# Patient Record
Sex: MALE | Race: WHITE | NOT HISPANIC OR LATINO | Employment: STUDENT | URBAN - METROPOLITAN AREA
[De-identification: names, ages, dates, MRNs, and addresses within clinical notes are randomized per-mention and may not be internally consistent; named-entity substitution may affect disease eponyms.]

---

## 2018-11-08 ENCOUNTER — OFFICE VISIT (OUTPATIENT)
Dept: URGENT CARE | Facility: CLINIC | Age: 23
End: 2018-11-08

## 2018-11-08 VITALS
HEART RATE: 61 BPM | BODY MASS INDEX: 22.53 KG/M2 | OXYGEN SATURATION: 99 % | DIASTOLIC BLOOD PRESSURE: 86 MMHG | WEIGHT: 170 LBS | HEIGHT: 73 IN | TEMPERATURE: 97 F | SYSTOLIC BLOOD PRESSURE: 133 MMHG

## 2018-11-08 DIAGNOSIS — Z02.0 SCHOOL PHYSICAL EXAM: ICD-10-CM

## 2018-11-08 DIAGNOSIS — Z11.1 ENCOUNTER FOR TB TINE TEST: Primary | ICD-10-CM

## 2018-11-08 PROCEDURE — 99499 UNLISTED E&M SERVICE: CPT | Mod: S$GLB,,, | Performed by: INTERNAL MEDICINE

## 2018-11-08 PROCEDURE — 86580 TB INTRADERMAL TEST: CPT | Mod: S$GLB,,, | Performed by: INTERNAL MEDICINE

## 2018-11-08 NOTE — PROGRESS NOTES
"Subjective:       Patient ID: Kirt Sandoval is a 23 y.o. male.    Vitals:  height is 6' 1" (1.854 m) and weight is 77.1 kg (170 lb). His oral temperature is 97.3 °F (36.3 °C). His blood pressure is 133/86 and his pulse is 61. His oxygen saturation is 99%.     Chief Complaint: Annual Exam    School physical and TB vac. Placement on 11/8/18      Review of Systems   Constitution: Negative for chills and fever.   HENT: Negative for sore throat.    Eyes: Negative for blurred vision.   Cardiovascular: Negative for chest pain.   Respiratory: Negative for shortness of breath.    Skin: Negative for rash.   Musculoskeletal: Negative for back pain and joint pain.   Gastrointestinal: Negative for abdominal pain, diarrhea, nausea and vomiting.   Neurological: Negative for headaches.   Psychiatric/Behavioral: The patient is not nervous/anxious.        Objective:      Physical Exam   Constitutional: He is oriented to person, place, and time. He appears well-developed and well-nourished.   HENT:   Head: Normocephalic and atraumatic.   Eyes: Conjunctivae and EOM are normal. Pupils are equal, round, and reactive to light.   Neck: Normal range of motion. Neck supple.   Cardiovascular: Normal rate and regular rhythm.   Pulmonary/Chest: Effort normal and breath sounds normal.   Abdominal: Soft. Bowel sounds are normal.   Musculoskeletal: Normal range of motion.   Neurological: He is alert and oriented to person, place, and time.   Skin: Skin is warm and dry.   Nursing note and vitals reviewed.      Assessment:       1. Encounter for TB lupe test        Plan:         Encounter for TB lupe test  -     POCT TB Skin Test Read         "

## 2018-11-11 LAB
TB INDURATION - 48 HR READ: NORMAL MM
TB INDURATION - 72 HR READ: 0 MM
TB SKIN TEST - 48 HR READ: NORMAL
TB SKIN TEST - 72 HR READ: NEGATIVE

## 2021-05-27 ENCOUNTER — TELEPHONE (OUTPATIENT)
Dept: URGENT CARE | Facility: CLINIC | Age: 26
End: 2021-05-27

## 2021-05-27 DIAGNOSIS — R11.0 NAUSEA: Primary | ICD-10-CM

## 2021-05-27 RX ORDER — ONDANSETRON 4 MG/1
4 TABLET, FILM COATED ORAL EVERY 8 HOURS PRN
Qty: 12 TABLET | Refills: 0 | Status: SHIPPED | OUTPATIENT
Start: 2021-05-27 | End: 2022-05-27